# Patient Record
Sex: FEMALE | Race: WHITE | NOT HISPANIC OR LATINO | Employment: OTHER | URBAN - METROPOLITAN AREA
[De-identification: names, ages, dates, MRNs, and addresses within clinical notes are randomized per-mention and may not be internally consistent; named-entity substitution may affect disease eponyms.]

---

## 2024-11-19 ENCOUNTER — TELEPHONE (OUTPATIENT)
Dept: ORTHOPEDIC SURGERY | Facility: CLINIC | Age: 89
End: 2024-11-19

## 2024-11-19 ENCOUNTER — OFFICE VISIT (OUTPATIENT)
Dept: ORTHOPEDIC SURGERY | Facility: CLINIC | Age: 89
End: 2024-11-19
Payer: MEDICARE

## 2024-11-19 VITALS — BODY MASS INDEX: 23.54 KG/M2 | WEIGHT: 150 LBS | HEIGHT: 67 IN

## 2024-11-19 DIAGNOSIS — M16.12 PRIMARY OSTEOARTHRITIS OF LEFT HIP: ICD-10-CM

## 2024-11-19 DIAGNOSIS — M70.62 GREATER TROCHANTERIC BURSITIS OF LEFT HIP: ICD-10-CM

## 2024-11-19 DIAGNOSIS — M25.552 LEFT HIP PAIN: Primary | ICD-10-CM

## 2024-11-19 PROCEDURE — 99203 OFFICE O/P NEW LOW 30 MIN: CPT | Performed by: NURSE PRACTITIONER

## 2024-11-19 NOTE — PROGRESS NOTES
Subjective:     Patient ID: Sharifa Lozada is a 89 y.o. female.    Chief Complaint:  Hip pain, new patient to examiner   History of Present Illness  History of Present Illness  The patient is an 89-year-old female who presents to the clinic today for evaluation of her left lower extremity. She is accompanied by her daughter.    She initially experienced pain along the front of her hip, which then spread to the groin and has now settled on the side of the hip. This discomfort is particularly noticeable during activities that involve changing positions. Despite this, she continues to walk.    Her family has been actively involved in her care, using a massage gun and other conservative treatments on her lower extremity's soft tissue. She reports pain radiating down the front of her thigh and into her knee, with the intensity varying at times. There are days when she does not experience significant pain.    Today, she reports increased pain on the side of her hip, describing it as a 5 out of 10 in terms of discomfort, and characterizes it as aching and shooting in nature. She experiences symptoms while seated and avoids sleeping on the affected side, thus she does not have significant pain at rest.    She has not had any prior x-ray imaging. She lives at home with her daughter and reports no other concerns at present.         Social History     Occupational History    Not on file   Tobacco Use    Smoking status: Never    Smokeless tobacco: Never   Substance and Sexual Activity    Alcohol use: Never    Drug use: Defer    Sexual activity: Defer      History reviewed. No pertinent past medical history.  History reviewed. No pertinent surgical history.    History reviewed. No pertinent family history.            Objective:  Physical Exam    Vital signs reviewed.   General: No acute distress.  Eyes: conjunctiva clear; pupils equally round and reactive  ENT: external ears and nose atraumatic; oropharynx clear  CV: no peripheral  "edema  Resp: normal respiratory effort  Skin: no rashes or wounds; normal turgor  Psych: mood and affect appropriate; recent and remote memory intact    Vitals:    11/19/24 1443   Weight: 68 kg (150 lb)   Height: 170.2 cm (67\")         11/19/24  1443   Weight: 68 kg (150 lb)     Body mass index is 23.49 kg/m².      Left Hip Exam     Tenderness   The patient is experiencing tenderness in the greater trochanter and anterior.    Range of Motion   Abduction:  45   Adduction:  25   Extension:  0   Flexion:  110   External rotation:  40   Internal rotation: 20     Tests   Fadir:  Positive FADIR test    Other   Erythema: absent  Sensation: normal  Pulse: present    Comments:  Mildly positive logroll exam  positive stinchfield exam  Quad strength 4+ out of 5             Physical Exam      Imaging:  Left Hip X-Ray  Indication: Pain  AP and Frog Leg views    Findings:  No fracture  No bony lesion  Normal soft tissues  Moderate to advanced hip degeneration greater left than right    No prior studies were available for comparison.    Assessment:        1. Left hip pain    2. Primary osteoarthritis of left hip    3. Greater trochanteric bursitis of left hip         Assessment & Plan  1. Left lower extremity pain.  She began experiencing pain along the anterior aspect of the hip, traveling down into the groin, and settling into the lateral aspect of the hip. The pain radiates down into the anterior aspect of the thigh and into the knee, with severity fluctuating. She experiences greater pain today at the lateral aspect of the hip, rated 5 out of 10, aching, and shooting in nature. There is no significant pain at rest.  Corticosteroid injections were discussed as an option, but she wishes to hold off at this time. Proceeding with a corticosteroid injection in the office along the greater trochanter for treatment of greater trochanteric bursitis was discussed, which could offer some symptom improvement. However, given the moderate " amount of arthritis at the hip, an injection to the hip either fluoroscopy-guided or ultrasound-guided would be of greater benefit, especially with the pain radiating down to the knee.  Based on her treatment options in the past I do believe we have 1 chance for an injection therefore I would choose the hip injection under fluoroscopy or ultrasound.  She is advised to continue with soft tissue work and can apply lidocaine patches. Ice is recommended at the lateral aspect of the hip when comfortable. If symptoms worsen, she is asked to send a message regarding the injection, and the order can be placed without her being seen. Encouraged to call with any questions or concerns.      Orders:  Orders Placed This Encounter   Procedures    XR Hip With or Without Pelvis 2 - 3 View Left     No orders of the defined types were placed in this encounter.      Dragon dictation utilized  BMI cannot be calculated due to outdated height or weight values.  Please input a current height/weight in Vitals and re-renter BMIFOLLOWUP in Note to pull in correct documentation based on BMI range.       Patient or patient representative verbalized consent for the use of Ambient Listening during the visit with  WILFREDO Morelos for chart documentation. 11/19/2024  17:01 EST

## 2024-11-19 NOTE — TELEPHONE ENCOUNTER
Caller: GEO JIANG    Relationship to patient: Emergency Contact    Best call back number: [phone]    Patient is needing: PT'S DAUGHTER IS CALLING TO INFORM [facility] CAO AND THE OFFICE THAT THE PT HAS DEMENTIA AND WANTED TO MAKE VIR[place] CAO AWARE BEFORE THE APPOINTMENT TODAY AT 2:15PM.. PLEASE ADVISE..

## 2025-01-13 ENCOUNTER — HOSPITAL ENCOUNTER (OUTPATIENT)
Facility: HOSPITAL | Age: 89
Setting detail: OBSERVATION
Discharge: HOME | End: 2025-01-14
Attending: EMERGENCY MEDICINE | Admitting: EMERGENCY MEDICINE
Payer: MEDICARE

## 2025-01-13 ENCOUNTER — APPOINTMENT (OUTPATIENT)
Dept: GENERAL RADIOLOGY | Facility: HOSPITAL | Age: 89
End: 2025-01-13
Payer: MEDICARE

## 2025-01-13 DIAGNOSIS — I10 HYPERTENSION, UNSPECIFIED TYPE: ICD-10-CM

## 2025-01-13 DIAGNOSIS — R07.9 CHEST PAIN, UNSPECIFIED TYPE: ICD-10-CM

## 2025-01-13 DIAGNOSIS — I48.91 NEW ONSET ATRIAL FIBRILLATION: Primary | ICD-10-CM

## 2025-01-13 LAB
ALBUMIN SERPL-MCNC: 3.9 G/DL (ref 3.5–5.2)
ALBUMIN/GLOB SERPL: 1.4 G/DL
ALP SERPL-CCNC: 158 U/L (ref 39–117)
ALT SERPL W P-5'-P-CCNC: 22 U/L (ref 1–33)
ANION GAP SERPL CALCULATED.3IONS-SCNC: 8.6 MMOL/L (ref 5–15)
AST SERPL-CCNC: 23 U/L (ref 1–32)
BASOPHILS # BLD AUTO: 0.03 10*3/MM3 (ref 0–0.2)
BASOPHILS NFR BLD AUTO: 0.5 % (ref 0–1.5)
BILIRUB SERPL-MCNC: 0.5 MG/DL (ref 0–1.2)
BUN SERPL-MCNC: 18 MG/DL (ref 8–23)
BUN/CREAT SERPL: 19.6 (ref 7–25)
CALCIUM SPEC-SCNC: 9.4 MG/DL (ref 8.6–10.5)
CHLORIDE SERPL-SCNC: 106 MMOL/L (ref 98–107)
CO2 SERPL-SCNC: 24.4 MMOL/L (ref 22–29)
CREAT SERPL-MCNC: 0.92 MG/DL (ref 0.57–1)
DEPRECATED RDW RBC AUTO: 43.4 FL (ref 37–54)
EGFRCR SERPLBLD CKD-EPI 2021: 60.4 ML/MIN/1.73
EOSINOPHIL # BLD AUTO: 0.16 10*3/MM3 (ref 0–0.4)
EOSINOPHIL NFR BLD AUTO: 2.8 % (ref 0.3–6.2)
ERYTHROCYTE [DISTWIDTH] IN BLOOD BY AUTOMATED COUNT: 13 % (ref 12.3–15.4)
GEN 5 1HR TROPONIN T REFLEX: 12 NG/L
GLOBULIN UR ELPH-MCNC: 2.8 GM/DL
GLUCOSE SERPL-MCNC: 106 MG/DL (ref 65–99)
HCT VFR BLD AUTO: 43.1 % (ref 34–46.6)
HGB BLD-MCNC: 14.7 G/DL (ref 12–15.9)
HOLD SPECIMEN: NORMAL
HOLD SPECIMEN: NORMAL
IMM GRANULOCYTES # BLD AUTO: 0.01 10*3/MM3 (ref 0–0.05)
IMM GRANULOCYTES NFR BLD AUTO: 0.2 % (ref 0–0.5)
LYMPHOCYTES # BLD AUTO: 1.97 10*3/MM3 (ref 0.7–3.1)
LYMPHOCYTES NFR BLD AUTO: 34.1 % (ref 19.6–45.3)
MCH RBC QN AUTO: 31.3 PG (ref 26.6–33)
MCHC RBC AUTO-ENTMCNC: 34.1 G/DL (ref 31.5–35.7)
MCV RBC AUTO: 91.9 FL (ref 79–97)
MONOCYTES # BLD AUTO: 0.32 10*3/MM3 (ref 0.1–0.9)
MONOCYTES NFR BLD AUTO: 5.5 % (ref 5–12)
NEUTROPHILS NFR BLD AUTO: 3.29 10*3/MM3 (ref 1.7–7)
NEUTROPHILS NFR BLD AUTO: 56.9 % (ref 42.7–76)
NRBC BLD AUTO-RTO: 0 /100 WBC (ref 0–0.2)
PLATELET # BLD AUTO: 194 10*3/MM3 (ref 140–450)
PMV BLD AUTO: 9.4 FL (ref 6–12)
POTASSIUM SERPL-SCNC: 4.1 MMOL/L (ref 3.5–5.2)
PROT SERPL-MCNC: 6.7 G/DL (ref 6–8.5)
RBC # BLD AUTO: 4.69 10*6/MM3 (ref 3.77–5.28)
SODIUM SERPL-SCNC: 139 MMOL/L (ref 136–145)
TROPONIN T NUMERIC DELTA: 0 NG/L
TROPONIN T SERPL HS-MCNC: 12 NG/L
WBC NRBC COR # BLD AUTO: 5.78 10*3/MM3 (ref 3.4–10.8)
WHOLE BLOOD HOLD COAG: NORMAL
WHOLE BLOOD HOLD SPECIMEN: NORMAL

## 2025-01-13 PROCEDURE — G0378 HOSPITAL OBSERVATION PER HR: HCPCS

## 2025-01-13 PROCEDURE — 71045 X-RAY EXAM CHEST 1 VIEW: CPT

## 2025-01-13 PROCEDURE — 93005 ELECTROCARDIOGRAM TRACING: CPT | Performed by: EMERGENCY MEDICINE

## 2025-01-13 PROCEDURE — 84484 ASSAY OF TROPONIN QUANT: CPT

## 2025-01-13 PROCEDURE — 36415 COLL VENOUS BLD VENIPUNCTURE: CPT

## 2025-01-13 PROCEDURE — 85025 COMPLETE CBC W/AUTO DIFF WBC: CPT

## 2025-01-13 PROCEDURE — 93005 ELECTROCARDIOGRAM TRACING: CPT

## 2025-01-13 PROCEDURE — 84484 ASSAY OF TROPONIN QUANT: CPT | Performed by: EMERGENCY MEDICINE

## 2025-01-13 PROCEDURE — 80053 COMPREHEN METABOLIC PANEL: CPT

## 2025-01-13 PROCEDURE — 96374 THER/PROPH/DIAG INJ IV PUSH: CPT

## 2025-01-13 PROCEDURE — 99285 EMERGENCY DEPT VISIT HI MDM: CPT

## 2025-01-13 RX ORDER — AMOXICILLIN 250 MG
2 CAPSULE ORAL 2 TIMES DAILY PRN
Status: DISCONTINUED | OUTPATIENT
Start: 2025-01-13 | End: 2025-01-14 | Stop reason: HOSPADM

## 2025-01-13 RX ORDER — SODIUM CHLORIDE 0.9 % (FLUSH) 0.9 %
10 SYRINGE (ML) INJECTION AS NEEDED
Status: DISCONTINUED | OUTPATIENT
Start: 2025-01-13 | End: 2025-01-14 | Stop reason: HOSPADM

## 2025-01-13 RX ORDER — SODIUM CHLORIDE 0.9 % (FLUSH) 0.9 %
10 SYRINGE (ML) INJECTION EVERY 12 HOURS SCHEDULED
Status: DISCONTINUED | OUTPATIENT
Start: 2025-01-13 | End: 2025-01-14 | Stop reason: HOSPADM

## 2025-01-13 RX ORDER — POLYETHYLENE GLYCOL 3350 17 G/17G
17 POWDER, FOR SOLUTION ORAL DAILY PRN
Status: DISCONTINUED | OUTPATIENT
Start: 2025-01-13 | End: 2025-01-14 | Stop reason: HOSPADM

## 2025-01-13 RX ORDER — BISACODYL 10 MG
10 SUPPOSITORY, RECTAL RECTAL DAILY PRN
Status: DISCONTINUED | OUTPATIENT
Start: 2025-01-13 | End: 2025-01-14 | Stop reason: HOSPADM

## 2025-01-13 RX ORDER — BISACODYL 5 MG/1
5 TABLET, DELAYED RELEASE ORAL DAILY PRN
Status: DISCONTINUED | OUTPATIENT
Start: 2025-01-13 | End: 2025-01-14 | Stop reason: HOSPADM

## 2025-01-13 RX ORDER — ONDANSETRON 4 MG/1
4 TABLET, ORALLY DISINTEGRATING ORAL EVERY 6 HOURS PRN
Status: DISCONTINUED | OUTPATIENT
Start: 2025-01-13 | End: 2025-01-14 | Stop reason: HOSPADM

## 2025-01-13 RX ORDER — ONDANSETRON 2 MG/ML
4 INJECTION INTRAMUSCULAR; INTRAVENOUS EVERY 6 HOURS PRN
Status: DISCONTINUED | OUTPATIENT
Start: 2025-01-13 | End: 2025-01-14 | Stop reason: HOSPADM

## 2025-01-13 RX ORDER — ASPIRIN 325 MG
325 TABLET ORAL ONCE
Status: COMPLETED | OUTPATIENT
Start: 2025-01-13 | End: 2025-01-13

## 2025-01-13 RX ORDER — NITROGLYCERIN 0.4 MG/1
0.4 TABLET SUBLINGUAL
Status: DISCONTINUED | OUTPATIENT
Start: 2025-01-13 | End: 2025-01-14 | Stop reason: HOSPADM

## 2025-01-13 RX ORDER — ACETAMINOPHEN 650 MG/1
650 SUPPOSITORY RECTAL EVERY 4 HOURS PRN
Status: DISCONTINUED | OUTPATIENT
Start: 2025-01-13 | End: 2025-01-14 | Stop reason: HOSPADM

## 2025-01-13 RX ORDER — ACETAMINOPHEN 325 MG/1
650 TABLET ORAL EVERY 4 HOURS PRN
Status: DISCONTINUED | OUTPATIENT
Start: 2025-01-13 | End: 2025-01-14 | Stop reason: HOSPADM

## 2025-01-13 RX ORDER — ACETAMINOPHEN 160 MG/5ML
650 SOLUTION ORAL EVERY 4 HOURS PRN
Status: DISCONTINUED | OUTPATIENT
Start: 2025-01-13 | End: 2025-01-14 | Stop reason: HOSPADM

## 2025-01-13 RX ORDER — SODIUM CHLORIDE 9 MG/ML
40 INJECTION, SOLUTION INTRAVENOUS AS NEEDED
Status: DISCONTINUED | OUTPATIENT
Start: 2025-01-13 | End: 2025-01-14 | Stop reason: HOSPADM

## 2025-01-13 RX ADMIN — METOPROLOL TARTRATE 5 MG: 1 INJECTION, SOLUTION INTRAVENOUS at 20:20

## 2025-01-13 RX ADMIN — ASPIRIN 325 MG ORAL TABLET 325 MG: 325 PILL ORAL at 19:53

## 2025-01-14 ENCOUNTER — APPOINTMENT (OUTPATIENT)
Dept: CARDIOLOGY | Facility: HOSPITAL | Age: 89
End: 2025-01-14
Payer: MEDICARE

## 2025-01-14 VITALS
DIASTOLIC BLOOD PRESSURE: 83 MMHG | HEIGHT: 66 IN | HEART RATE: 90 BPM | WEIGHT: 145 LBS | RESPIRATION RATE: 18 BRPM | SYSTOLIC BLOOD PRESSURE: 148 MMHG | TEMPERATURE: 97.7 F | BODY MASS INDEX: 23.3 KG/M2 | OXYGEN SATURATION: 98 %

## 2025-01-14 LAB
ANION GAP SERPL CALCULATED.3IONS-SCNC: 8 MMOL/L (ref 5–15)
BH CV ECHO MEAS - ACS: 1.83 CM
BH CV ECHO MEAS - AO ROOT DIAM: 2.6 CM
BH CV ECHO MEAS - EDV(CUBED): 69.2 ML
BH CV ECHO MEAS - ESV(CUBED): 27.7 ML
BH CV ECHO MEAS - FS: 26.3 %
BH CV ECHO MEAS - IVS/LVPW: 0.79 CM
BH CV ECHO MEAS - IVSD: 0.98 CM
BH CV ECHO MEAS - LV MASS(C)D: 154 GRAMS
BH CV ECHO MEAS - LVIDD: 4.1 CM
BH CV ECHO MEAS - LVIDS: 3 CM
BH CV ECHO MEAS - LVOT AREA: 3.2 CM2
BH CV ECHO MEAS - LVOT DIAM: 2.01 CM
BH CV ECHO MEAS - LVPWD: 1.24 CM
BH CV ECHO MEAS - PA ACC TIME: 0.11 SEC
BH CV ECHO MEAS - PA V2 MAX: 82.2 CM/SEC
BH CV ECHO MEAS - RV MAX PG: 2.27 MMHG
BH CV ECHO MEAS - RV V1 MAX: 75.4 CM/SEC
BH CV ECHO MEAS - RV V1 VTI: 10.9 CM
BH CV ECHO MEAS - TR MAX PG: 21.8 MMHG
BH CV ECHO MEAS - TR MAX VEL: 233.5 CM/SEC
BUN SERPL-MCNC: 15 MG/DL (ref 8–23)
BUN/CREAT SERPL: 21.4 (ref 7–25)
CALCIUM SPEC-SCNC: 9.1 MG/DL (ref 8.6–10.5)
CHLORIDE SERPL-SCNC: 108 MMOL/L (ref 98–107)
CO2 SERPL-SCNC: 22 MMOL/L (ref 22–29)
CREAT SERPL-MCNC: 0.7 MG/DL (ref 0.57–1)
DEPRECATED RDW RBC AUTO: 43.2 FL (ref 37–54)
EGFRCR SERPLBLD CKD-EPI 2021: 83.8 ML/MIN/1.73
ERYTHROCYTE [DISTWIDTH] IN BLOOD BY AUTOMATED COUNT: 12.8 % (ref 12.3–15.4)
GLUCOSE SERPL-MCNC: 90 MG/DL (ref 65–99)
HCT VFR BLD AUTO: 42 % (ref 34–46.6)
HGB BLD-MCNC: 14 G/DL (ref 12–15.9)
MCH RBC QN AUTO: 31.3 PG (ref 26.6–33)
MCHC RBC AUTO-ENTMCNC: 33.3 G/DL (ref 31.5–35.7)
MCV RBC AUTO: 94 FL (ref 79–97)
PLATELET # BLD AUTO: 183 10*3/MM3 (ref 140–450)
PMV BLD AUTO: 9.5 FL (ref 6–12)
POTASSIUM SERPL-SCNC: 3.8 MMOL/L (ref 3.5–5.2)
QT INTERVAL: 352 MS
QTC INTERVAL: 431 MS
RBC # BLD AUTO: 4.47 10*6/MM3 (ref 3.77–5.28)
SINUS: 2.7 CM
SODIUM SERPL-SCNC: 138 MMOL/L (ref 136–145)
STJ: 2.6 CM
TROPONIN T SERPL HS-MCNC: 13 NG/L
WBC NRBC COR # BLD AUTO: 5.36 10*3/MM3 (ref 3.4–10.8)

## 2025-01-14 PROCEDURE — G0378 HOSPITAL OBSERVATION PER HR: HCPCS

## 2025-01-14 PROCEDURE — 93308 TTE F-UP OR LMTD: CPT

## 2025-01-14 PROCEDURE — 93321 DOPPLER ECHO F-UP/LMTD STD: CPT

## 2025-01-14 PROCEDURE — 93325 DOPPLER ECHO COLOR FLOW MAPG: CPT

## 2025-01-14 PROCEDURE — 80048 BASIC METABOLIC PNL TOTAL CA: CPT

## 2025-01-14 PROCEDURE — 85027 COMPLETE CBC AUTOMATED: CPT

## 2025-01-14 PROCEDURE — 84484 ASSAY OF TROPONIN QUANT: CPT

## 2025-01-14 RX ORDER — METOPROLOL SUCCINATE 25 MG/1
25 TABLET, EXTENDED RELEASE ORAL
Status: DISCONTINUED | OUTPATIENT
Start: 2025-01-14 | End: 2025-01-14

## 2025-01-14 RX ORDER — METOPROLOL SUCCINATE 25 MG/1
12.5 TABLET, EXTENDED RELEASE ORAL
Status: DISCONTINUED | OUTPATIENT
Start: 2025-01-14 | End: 2025-01-14 | Stop reason: HOSPADM

## 2025-01-14 RX ADMIN — METOPROLOL SUCCINATE 12.5 MG: 25 TABLET, EXTENDED RELEASE ORAL at 08:00

## 2025-01-14 RX ADMIN — Medication 10 ML: at 08:00

## 2025-01-14 NOTE — CASE MANAGEMENT/SOCIAL WORK
Case Management Discharge Note      Final Note: Home         Selected Continued Care - Discharged on 1/14/2025 Admission date: 1/13/2025 - Discharge disposition: Home or Self Care      Destination    No services have been selected for the patient.                Durable Medical Equipment    No services have been selected for the patient.                Dialysis/Infusion    No services have been selected for the patient.                Home Medical Care    No services have been selected for the patient.                Therapy    No services have been selected for the patient.                Community Resources    No services have been selected for the patient.                Community & DME    No services have been selected for the patient.                    Transportation Services  Private: Car    Final Discharge Disposition Code: 01 - home or self-care

## 2025-01-14 NOTE — PROGRESS NOTES
MD ATTESTATION NOTE     SHARED VISIT: This visit was performed by BOTH a physician and an APC. The substantive portion of the medical decision making was performed by this attesting physician who made or approved the management plan and takes responsibility for patient management. All studies in the APC note (if performed) were independently interpreted by me.  The VENESSA and I have discussed this patient's history, physical exam, and treatment plan. I have reviewed the documentation and personally had a face to face interaction with the patient. I affirm the documentation and agree with the treatment and plan. I provided a substantive portion of the care of the patient.  I personally performed the physical exam in its entirety, and below are my findings.      Brief HPI: Patient is resting comfortably.  Denies chest pain, shortness of breath, dizziness, or palpitations.    PHYSICAL EXAM    GENERAL: Awake and alert.  Nontoxic appearing elderly female.  Resting comfortably in no acute distress  HENT: nares patent  EYES: no scleral icterus  CV: Irregularly irregular rhythm, normal rate  RESPIRATORY: normal effort, CTAB  ABDOMEN: soft  MUSCULOSKELETAL: no deformity  NEURO: alert, moves all extremities, follows commands  PSYCH:  calm, cooperative  SKIN: warm, dry    Vital signs and nursing notes reviewed.        Plan: Patient is asymptomatic.  She remains in A-fib but is not currently tachycardic.  Troponin is negative x 3.  Labs are unremarkable. Echocardiogram showed normal LV systolic function with mild concentric LVH.  There was moderate tricuspid regurgitation.  Cardiology consult is pending.

## 2025-01-14 NOTE — DISCHARGE SUMMARY
ED OBSERVATION PROGRESS/DISCHARGE SUMMARY    Date of Admission: 1/13/2025   LOS: 0 days   PCP: Provider, No Known    Final Diagnosis new onset A-fib      Subjective     Hospital Outcome: Sharifa Lozada is a 89 y.o. female, with no known past medical history, was admitted to the observation unit after having an episode of chest pain at home.  Patient states she was sitting at home when she all of a sudden began having a nonradiating midsternal chest pressure that lasted approximately half an hour.  Her daughter drove her to the emergency department and about the time they arrived her pain had subsided.  She denies any nausea, vomiting, shortness of breath, or diaphoresis associated with her chest pain.  She denies any palpitations.  Upon arrival to the emergency department she was found to be in atrial fibrillation.  Patient denies ever being diagnosed with atrial fibrillation or having an irregular heartbeat.  She does however state that she has had episodes for the past 5 years of having a rapid heartbeat that last approximately 5 to 10 minutes and subsequently goes away.  She has never sought treatment for this however.  Echocardiogram has been ordered for the a.m.  Cardiology has been consulted to see the patient.    1/14/2025: Patient denies any new complaints today.  Echo obtained but was a limited study due to patient refusal to complete the exam (patient has a known history of dementia) did show EF of 61 to 65% mild concentric hypertrophy, and moderate tricuspid valve regurg.  Cardiology saw and evaluated the patient and discussed options for further cardiac testing versus trial of over-the-counter medication for GERD and patient does not want to pursue any further testing and would like to trial over-the-counter PPI and follow-up with cardiology outpatient in 1 to 2 weeks.  All labs and imaging findings discussed with patient as well as specialist recommendations and patient is agreeable for discharge home at  this time.    ROS:  General: no fevers, chills  Respiratory: no cough, dyspnea  Cardiovascular: no chest pain, palpitations  Abdomen: No abdominal pain, nausea, vomiting, or diarrhea  Neurologic: No focal weakness    Objective   Physical Exam:  I have reviewed the vital signs.  Temp:  [97.5 °F (36.4 °C)-98 °F (36.7 °C)] 97.7 °F (36.5 °C)  Heart Rate:  [] 90  Resp:  [16-20] 18  BP: (100-160)/() 148/83  General Appearance:    Alert, cooperative, no distress  Head:    Normocephalic, atraumatic, normal hearing  Eyes:    Sclerae anicteric, EOMI  Neck:   Supple, nontender  Lungs: Clear to auscultation bilaterally, respirations unlabored on room air  Heart: Regular rate and rhythm, S1 and S2 normal, no murmur  Abdomen:  Soft, nontender, bowel sounds active, nondistended  Extremities: No clubbing, cyanosis, or edema to lower extremities  Pulses:  2+ and symmetric in distal lower extremities  Skin: No rashes   Neurologic: Oriented x3, Normal strength to extremities, poor memory    Results Review:    I have reviewed the labs, radiology results and diagnostic studies.    Results from last 7 days   Lab Units 01/14/25  0224   WBC 10*3/mm3 5.36   HEMOGLOBIN g/dL 14.0   HEMATOCRIT % 42.0   PLATELETS 10*3/mm3 183     Results from last 7 days   Lab Units 01/14/25  0224 01/13/25  1757   SODIUM mmol/L 138 139   POTASSIUM mmol/L 3.8 4.1   CHLORIDE mmol/L 108* 106   CO2 mmol/L 22.0 24.4   BUN mg/dL 15 18   CREATININE mg/dL 0.70 0.92   CALCIUM mg/dL 9.1 9.4   BILIRUBIN mg/dL  --  0.5   ALK PHOS U/L  --  158*   ALT (SGPT) U/L  --  22   AST (SGOT) U/L  --  23   GLUCOSE mg/dL 90 106*     Imaging Results (Last 24 Hours)       Procedure Component Value Units Date/Time    XR Chest 1 View [[id number]] Collected: 01/13/25 1829     Updated: 01/13/25 1833    Narrative:      XR CHEST 1 VW-1/13/2025     HISTORY: Chest pain.     Heart size is at the upper limits of normal. Lungs appear clear. There  is some aortic calcification. Bony  structures appear unremarkable.       Impression:      1. Borderline cardiomegaly.        This report was finalized on 1/13/2025 6:30 PM by Dr. Fermin Beatty M.D on Workstation: [id number]               I have reviewed the medications.     Discharge Medications      Patient Not Prescribed Medications Upon Discharge        ---------------------------------------------------------------------------------------------  Assessment & Plan   Assessment/Problem List    New onset atrial fibrillation      Plan:    New onset atrial fibrillation  Chest pain   -Cardiac monitoring  -Vital signs every 4 hours   -Cardiology consult   -Troponins negative throughout admission  -EKG-atrial fibrillation, rate 90  -Echocardiogram limited due to patient refusing to complete exam, did show normal EF  -Cardiology saw and evaluated the patient and discussed options for further cardiac testing versus trial of over-the-counter medication for GERD and patient does not want to pursue any further testing and would like to trial over-the-counter PPI and follow-up with cardiology outpatient in 1 to 2 weeks.    Disposition: Discharge to home    Follow-up after Discharge: PCP in 1 to 2 weeks, cardiology in 1 to 2 weeks    This note will serve as a discharge summary    Shala Stringer PA-C 01/14/25 08:21 EST    I have worn appropriate PPE during this patient encounter, sanitized my hands both with entering and exiting patient's room.      39 minutes has been spent by [facility] providers in the care of this patient while under observation status

## 2025-01-14 NOTE — ED NOTES
Nursing report ED to floor  Sharifa Lozada  89 y.o.  female    HPI :  HPI  Stated Reason for Visit: cp    Chief Complaint  Chief Complaint   Patient presents with    Chest Pain       Admitting doctor:   Felice Kelly MD    Admitting diagnosis:   The primary encounter diagnosis was New onset atrial fibrillation. Diagnoses of Chest pain, unspecified type and Hypertension, unspecified type were also pertinent to this visit.    Code status:   Current Code Status       Date Active Code Status Order ID Comments User Context       1/13/2025 2148 CPR (Attempt to Resuscitate) [id number]  Rika Diamond, [facility]        Question Answer    Code Status (Patient has no pulse and is not breathing) CPR (Attempt to Resuscitate)    Medical Interventions (Patient has pulse or is breathing) Full Support                    Allergies:   Penicillins    Isolation:   No active isolations    Intake and Output  No intake or output data in the 24 hours ending 01/13/25 2200    Weight:   There were no vitals filed for this visit.    Most recent vitals:   Vitals:    01/13/25 2030 01/13/25 2047 01/13/25 2102 01/13/25 2114   BP: 100/79 126/86 127/61 140/83   BP Location:       Patient Position:       Pulse: 95 84 77 81   Resp:   16 18   Temp:       SpO2: 97% 96% 93% 98%       Active LDAs/IV Access:   Lines, Drains & Airways       Active LDAs       Name Placement date Placement time Site Days    Peripheral IV 01/13/25 2015 Anterior;Distal;Right Forearm 01/13/25 2015  Forearm  less than 1                    Labs (abnormal labs have a star):   Labs Reviewed   COMPREHENSIVE METABOLIC PANEL - Abnormal; Notable for the following components:       Result Value    Glucose 106 (*)     Alkaline Phosphatase 158 (*)     All other components within normal limits    Narrative:     GFR Categories in Chronic Kidney Disease (CKD)      GFR Category          GFR (mL/min/1.73)    Interpretation  G1                     90 or greater         Normal or high (1)  G2                       60-89                Mild decrease (1)  G3a                   45-59                Mild to moderate decrease  G3b                   30-44                Moderate to severe decrease  G4                    15-29                Severe decrease  G5                    14 or less           Kidney failure          (1)In the absence of evidence of kidney disease, neither GFR category G1 or G2 fulfill the criteria for CKD.    eGFR calculation 2021 CKD-EPI creatinine equation, which does not include race as a factor   TROPONIN - Normal    Narrative:     High Sensitive Troponin T Reference Range:  <14.0 ng/L- Negative Female for AMI  <22.0 ng/L- Negative Male for AMI  >=14 - Abnormal Female indicating possible myocardial injury.  >=22 - Abnormal Male indicating possible myocardial injury.   Clinicians would have to utilize clinical acumen, EKG, Troponin, and serial changes to determine if it is an Acute Myocardial Infarction or myocardial injury due to an underlying chronic condition.        CBC WITH AUTO DIFFERENTIAL - Normal   RAINBOW DRAW    Narrative:     The following orders were created for panel order [place] Draw.  Procedure                               Abnormality         Status                     ---------                               -----------         ------                     Green Top (Gel)[[id number]]                                  Final result               Lavender Top[[id number]]                                     Final result               Gold Top - SST[[id number]]                                   Final result               Light Blue Top[[id number]]                                   Final result                 Please view results for these tests on the individual orders.   HIGH SENSITIVITIY TROPONIN T 1HR   CBC AND DIFFERENTIAL    Narrative:     The following orders were created for panel order CBC & Differential.  Procedure                               Abnormality         Status                      ---------                               -----------         ------                     CBC Auto Differential[[id number]]        Normal              Final result                 Please view results for these tests on the individual orders.   GREEN TOP   LAVENDER TOP   GOLD TOP - SST   LIGHT BLUE TOP       EKG:   ECG 12 Lead ED Triage Standing Order; Chest Pain   Preliminary Result   HEART RATE=90  bpm   RR Mthfksjc=779  ms   SD Interval=  ms   P Horizontal Axis=  deg   P Front Axis=  deg   QRSD Interval=83  ms   QT Gnwafckb=804  ms   YPnB=586  ms   QRS Axis=69  deg   T Wave Axis=48  deg   - ABNORMAL ECG -   Atrial fibrillation   Date and Time of Study:2025-01-13 17:44:17          Meds given in ED:   Medications   sodium chloride 0.9 % flush 10 mL (has no administration in time range)   sodium chloride 0.9 % flush 10 mL (has no administration in time range)   sodium chloride 0.9 % flush 10 mL (has no administration in time range)   sodium chloride 0.9 % infusion 40 mL (has no administration in time range)   ondansetron ODT (ZOFRAN-ODT) disintegrating tablet 4 mg (has no administration in time range)     Or   ondansetron (ZOFRAN) injection 4 mg (has no administration in time range)   nitroglycerin (NITROSTAT) SL tablet 0.4 mg (has no administration in time range)   Potassium Replacement - Follow Nurse / BPA Driven Protocol (has no administration in time range)   Magnesium Standard Dose Replacement - Follow Nurse / BPA Driven Protocol (has no administration in time range)   Phosphorus Replacement - Follow Nurse / BPA Driven Protocol (has no administration in time range)   Calcium Replacement - Follow Nurse / BPA Driven Protocol (has no administration in time range)   acetaminophen (TYLENOL) tablet 650 mg (has no administration in time range)     Or   acetaminophen (TYLENOL) 160 MG/5ML oral solution 650 mg (has no administration in time range)     Or   acetaminophen (TYLENOL) suppository 650 mg (has no  administration in time range)   sennosides-docusate (PERICOLACE) 8.6-50 MG per tablet 2 tablet (has no administration in time range)     And   polyethylene glycol (MIRALAX) packet 17 g (has no administration in time range)     And   bisacodyl (DULCOLAX) EC tablet 5 mg (has no administration in time range)     And   bisacodyl (DULCOLAX) suppository 10 mg (has no administration in time range)   aspirin tablet 325 mg (325 mg Oral Given 1/13/25 1953)   metoprolol tartrate (LOPRESSOR) injection 5 mg (5 mg Intravenous Given 1/13/25 2020)       Imaging results:  XR Chest 1 View    Result Date: 1/13/2025  1. Borderline cardiomegaly.   This report was finalized on 1/13/2025 6:30 PM by Dr. Fermin Beatty M.D on Workstation: [id number]       Ambulatory status:   - +1    Social issues:   Social History     Socioeconomic History    Marital status:    Tobacco Use    Smoking status: Never    Smokeless tobacco: Never   Substance and Sexual Activity    Alcohol use: Never    Drug use: Defer    Sexual activity: Defer       Peripheral Neurovascular  Peripheral Neurovascular (Adult)  Peripheral Neurovascular WDL: WDL    Neuro Cognitive  Neuro Cognitive (Adult)  Cognitive/Neuro/Behavioral WDL: WDL  Sedation Group  POSS (Pasero Opioid-Induced Sed Scale): 1 - Awake and alert    Learning  Learning Assessment  Learning Readiness and Ability: cognitive limitation noted  Education Provided  Person Taught: patient, family member/friend  Teaching Method: verbal instruction  Teaching Focus: symptom/problem overview, diagnostic test  Education Outcome Evaluation: verbalizes understanding    Respiratory  Respiratory WDL  Respiratory WDL: WDL    Abdominal Pain       Pain Assessments  Pain (Adult)  (0-10) Pain Rating: Rest: 0  Response to Pain Interventions: interventions effective per patient    NIH Stroke Scale       Meg Linares, RN  01/13/25 22:00 EST

## 2025-01-14 NOTE — PLAN OF CARE
Goal Outcome Evaluation:      Pt dc via private vehicle with belongings. IV removed. Education provided to pt and daughter. Questions encouraged and answered.

## 2025-01-14 NOTE — H&P
[facility]   HISTORY AND PHYSICAL    Patient Name: Sharifa Lozada  : 1935  MRN: [id number]  Primary Care Physician:  Provider, No Known  Date of admission: 2025    Subjective   Subjective     Chief Complaint:   Chief Complaint   Patient presents with    Chest Pain         HPI:    Sharifa Lozada is a 89 y.o. female, with no known past medical history, was admitted to the observation unit after having an episode of chest pain at home.  Patient states she was sitting at home when she all of a sudden began having a nonradiating midsternal chest pressure that lasted approximately half an hour.  Her daughter drove her to the emergency department and about the time they arrived her pain had subsided.  She denies any nausea, vomiting, shortness of breath, or diaphoresis associated with her chest pain.  She denies any palpitations.  Upon arrival to the emergency department she was found to be in atrial fibrillation.  Patient denies ever being diagnosed with atrial fibrillation or having an irregular heartbeat.  She does however state that she has had episodes for the past 5 years of having a rapid heartbeat that last approximately 5 to 10 minutes and subsequently goes away.  She has never sought treatment for this however.  Echocardiogram has been ordered for the a.m.  Cardiology has been consulted to see the patient.    Review of Systems   All systems were reviewed and negative except for: What was mentioned above in the HPI.    Personal History     History reviewed. No pertinent past medical history.    History reviewed. No pertinent surgical history.    Family History: family history is not on file. Otherwise pertinent FHx was reviewed and not pertinent to current issue.    Social History:  reports that she has never smoked. She has never used smokeless tobacco. Drug use questions deferred to the physician. She reports that she does not drink alcohol.    Home Medications:       Allergies:  Allergies   Allergen  Reactions    Penicillins Unknown - High Severity       Objective   Objective     Vitals:   Temp:  [97.5 °F (36.4 °C)-97.8 °F (36.6 °C)] 97.5 °F (36.4 °C)  Heart Rate:  [] 79  Resp:  [16-20] 18  BP: (100-160)/() 123/94  Physical Exam   Constitutional: Awake, alert   Eyes: PERRLA   HENT: NCAT, mucous membranes moist   Neck: Supple   Respiratory: Clear to auscultation bilaterally, nonlabored respirations    Cardiovascular: irregular rate, palpable pedal pulses bilaterally   Gastrointestinal: Positive bowel sounds, soft, nontender, nondistended   Musculoskeletal: No bilateral ankle edema   Psychiatric: Appropriate affect, cooperative   Neurologic: Oriented x 3, speech clear   Skin: No rashes       Result Review    Result Review:  I have personally reviewed the results from the time of this admission to 1/13/2025 23:19 EST and agree with these findings:  [x]  Laboratory list / accordion  []  Microbiology  [x]  Radiology  [x]  EKG/Telemetry   []  Cardiology/Vascular   []  Pathology  [x]  Old records  []  Other:    Lab work in the emergency department is unremarkable other than a glucose of 106, alkaline phosphatase of 158.  EKG shows atrial fibrillation with a rate of 90.  Chest x-ray shows borderline cardiomegaly.      Assessment & Plan   Assessment / Plan     Brief Patient Summary:  Sharifa Lozada is a 89 y.o. female who was admitted to the observation unit for further evaluation and treatment of her new onset atrial fibrillation with RVR and chest pain.      Active Hospital Problems:  Active Hospital Problems    Diagnosis     **New onset atrial fibrillation      Plan:     New onset atrial fibrillation  Chest pain   -Cardiac monitoring  -Vital signs every 4 hours   -Cardiology consult   -High-sensitivity troponin 12, 12, trend  -EKG-atrial fibrillation, rate 90  -N.p.o. after midnight   -Echocardiogram in a.m.        VTE Prophylaxis:  Mechanical VTE prophylaxis orders are present.        CODE STATUS:    Code  Status (Patient has no pulse and is not breathing): CPR (Attempt to Resuscitate)  Medical Interventions (Patient has pulse or is breathing): Full Support    Admission Status:  I believe this patient meets observation status.    76 minutes have been spent by [facility] providers in the care of this patient while under observation status.      Appropriate PPE worn during patient encounter.  Hand hygeine performed before and after seeing the patient.      Electronically signed by WILFREDO Amin, 01/13/25, 11:19 PM EST.

## 2025-01-14 NOTE — ED PROVIDER NOTES
EMERGENCY DEPARTMENT ENCOUNTER    Room Number:  16/16  PCP: Provider, No Known  Historian: Patient/family      HPI:  Chief Complaint: Chest pain  A complete HPI/ROS/PMH/PSH/SH/FH are unobtainable due to: None  Context: Sharifa Lozada is a 89 y.o. female who presents to the ED c/o sudden onset of mid/substernal chest discomfort that began roughly 2 to 3 hours prior to ED arrival today.  She reports that the pain probably lasted at least 30 minutes before it subsequently resolved.  Currently, she reports that she is chest pain-free.  She did describe the pain at that time as a severe pain that was dull in nature and nonradiating.  She denies any associated heart palpitations, shortness of breath, nausea/vomiting, back pain, extremity pain, abdominal pain, or diaphoresis.            PAST MEDICAL HISTORY  Active Ambulatory Problems     Diagnosis Date Noted    No Active Ambulatory Problems     Resolved Ambulatory Problems     Diagnosis Date Noted    No Resolved Ambulatory Problems     No Additional Past Medical History         PAST SURGICAL HISTORY  History reviewed. No pertinent surgical history.      FAMILY HISTORY  History reviewed. No pertinent family history.      SOCIAL HISTORY  Social History     Socioeconomic History    Marital status:    Tobacco Use    Smoking status: Never    Smokeless tobacco: Never   Substance and Sexual Activity    Alcohol use: Never    Drug use: Defer    Sexual activity: Defer         ALLERGIES  Penicillins        REVIEW OF SYSTEMS  Review of Systems   Constitutional:  Negative for fever.   HENT:  Negative for sore throat.    Eyes: Negative.    Respiratory:  Negative for cough and shortness of breath.    Cardiovascular:  Positive for chest pain.   Gastrointestinal:  Negative for abdominal pain, diarrhea and vomiting.   Genitourinary:  Negative for dysuria.   Musculoskeletal:  Negative for neck pain.   Skin:  Negative for rash.   Allergic/Immunologic: Negative.    Neurological:   Negative for weakness, numbness and headaches.   Hematological: Negative.    Psychiatric/Behavioral: Negative.     All other systems reviewed and are negative.         PHYSICAL EXAM  ED Triage Vitals   Temp Heart Rate Resp BP SpO2   01/13/25 1734 01/13/25 1734 01/13/25 1734 01/13/25 1738 01/13/25 1734   97.8 °F (36.6 °C) 94 18 142/79 98 %      Temp src Heart Rate Source Patient Position BP Location FiO2 (%)   -- 01/13/25 1734 01/13/25 1738 01/13/25 1738 --    Monitor Sitting Right arm        Physical Exam  Constitutional:       General: She is not in acute distress.     Appearance: Normal appearance. She is not ill-appearing or toxic-appearing.   HENT:      Head: Normocephalic and atraumatic.   Eyes:      Extraocular Movements: Extraocular movements intact.      Pupils: Pupils are equal, round, and reactive to light.   Cardiovascular:      Rate and Rhythm: Tachycardia present. Rhythm irregularly irregular.      Heart sounds: No murmur heard.     No friction rub. No gallop.   Pulmonary:      Effort: Pulmonary effort is normal.      Breath sounds: Normal breath sounds.   Abdominal:      General: Abdomen is flat. There is no distension.      Palpations: Abdomen is soft.      Tenderness: There is no abdominal tenderness.   Musculoskeletal:         General: No swelling or tenderness. Normal range of motion.      Cervical back: Normal range of motion and neck supple.   Skin:     General: Skin is warm and dry.   Neurological:      General: No focal deficit present.      Mental Status: She is alert and oriented to person, place, and time.      Sensory: No sensory deficit.      Motor: No weakness.   Psychiatric:         Mood and Affect: Mood normal.         Behavior: Behavior normal.           Vital signs and nursing notes reviewed.          LAB RESULTS  Recent Results (from the past 24 hours)   ECG 12 Lead ED Triage Standing Order; Chest Pain    Collection Time: 01/13/25  5:44 PM   Result Value Ref Range    QT Interval 352  ms    QTC Interval 431 ms   Comprehensive Metabolic Panel    Collection Time: 01/13/25  5:57 PM    Specimen: Arm, Left; Blood   Result Value Ref Range    Glucose 106 (H) 65 - 99 mg/dL    BUN 18 8 - 23 mg/dL    Creatinine 0.92 0.57 - 1.00 mg/dL    Sodium 139 136 - 145 mmol/L    Potassium 4.1 3.5 - 5.2 mmol/L    Chloride 106 98 - 107 mmol/L    CO2 24.4 22.0 - 29.0 mmol/L    Calcium 9.4 8.6 - 10.5 mg/dL    Total Protein 6.7 6.0 - 8.5 g/dL    Albumin 3.9 3.5 - 5.2 g/dL    ALT (SGPT) 22 1 - 33 U/L    AST (SGOT) 23 1 - 32 U/L    Alkaline Phosphatase 158 (H) 39 - 117 U/L    Total Bilirubin 0.5 0.0 - 1.2 mg/dL    Globulin 2.8 gm/dL    A/G Ratio 1.4 g/dL    BUN/Creatinine Ratio 19.6 7.0 - 25.0    Anion Gap 8.6 5.0 - 15.0 mmol/L    eGFR 60.4 >60.0 mL/min/1.73   High Sensitivity Troponin T    Collection Time: 01/13/25  5:57 PM    Specimen: Arm, Left; Blood   Result Value Ref Range    HS Troponin T 12 <14 ng/L   Green Top (Gel)    Collection Time: 01/13/25  5:57 PM   Result Value Ref Range    Extra Tube Hold for add-ons.    Lavender Top    Collection Time: 01/13/25  5:57 PM   Result Value Ref Range    Extra Tube hold for add-on    Gold Top - SST    Collection Time: 01/13/25  5:57 PM   Result Value Ref Range    Extra Tube Hold for add-ons.    Light Blue Top    Collection Time: 01/13/25  5:57 PM   Result Value Ref Range    Extra Tube Hold for add-ons.    CBC Auto Differential    Collection Time: 01/13/25  5:57 PM    Specimen: Arm, Left; Blood   Result Value Ref Range    WBC 5.78 3.40 - 10.80 10*3/mm3    RBC 4.69 3.77 - 5.28 10*6/mm3    Hemoglobin 14.7 12.0 - 15.9 g/dL    Hematocrit 43.1 34.0 - 46.6 %    MCV 91.9 79.0 - 97.0 fL    MCH 31.3 26.6 - 33.0 pg    MCHC 34.1 31.5 - 35.7 g/dL    RDW 13.0 12.3 - 15.4 %    RDW-SD 43.4 37.0 - 54.0 fl    MPV 9.4 6.0 - 12.0 fL    Platelets 194 140 - 450 10*3/mm3    Neutrophil % 56.9 42.7 - 76.0 %    Lymphocyte % 34.1 19.6 - 45.3 %    Monocyte % 5.5 5.0 - 12.0 %    Eosinophil % 2.8 0.3 - 6.2 %     Basophil % 0.5 0.0 - 1.5 %    Immature Grans % 0.2 0.0 - 0.5 %    Neutrophils, Absolute 3.29 1.70 - 7.00 10*3/mm3    Lymphocytes, Absolute 1.97 0.70 - 3.10 10*3/mm3    Monocytes, Absolute 0.32 0.10 - 0.90 10*3/mm3    Eosinophils, Absolute 0.16 0.00 - 0.40 10*3/mm3    Basophils, Absolute 0.03 0.00 - 0.20 10*3/mm3    Immature Grans, Absolute 0.01 0.00 - 0.05 10*3/mm3    nRBC 0.0 0.0 - 0.2 /100 WBC       Ordered the above labs and reviewed the results.        RADIOLOGY  XR Chest 1 View    Result Date: 1/13/2025  XR CHEST 1 VW-1/13/2025  HISTORY: Chest pain.  Heart size is at the upper limits of normal. Lungs appear clear. There is some aortic calcification. Bony structures appear unremarkable.      1. Borderline cardiomegaly.   This report was finalized on 1/13/2025 6:30 PM by Dr. Fermin Beatty M.D on Workstation: [id number]       Ordered the above noted radiological studies. Reviewed by me in PACS.            PROCEDURES  Procedures    EKG independently interpreted by myself as follows:    EKG          EKG time: 1744  Rhythm/Rate: atrial fibrillation, 90  P waves and MT: absent  QRS, axis: nml, nml   ST and T waves: nml     Interpreted Contemporaneously by me, independently viewed  Previous EKG available for comparison        HEART SCORE    History Moderately suspicious (1)  ECG Normal (0)  Age > or = 65 (2)  Risk factors No risk factors (0)  Troponin < or = Normal limit (0)    This patient's HEART score is 3    HEART Score Key:  Scores 0-3: 0.9-1.7% risk of adverse cardiac event. In the HEART Score study, these patients were discharged (0.99% in the retrospective study, 1.7% in the prospective study)  Scores 4-6: 12-16.6% risk of adverse cardiac event. In the HEART Score study, these patients were admitted to the hospital. (11.6% retrospective, 16.6% prospective)  Scores >=7: 50-65% risk of adverse cardiac event. In the HEART Score study, these patients were candidates for early invasive measures. (65.2%  retrospective, 50.1% prospective)        MEDICATIONS GIVEN IN ER  Medications   sodium chloride 0.9 % flush 10 mL (has no administration in time range)   aspirin tablet 325 mg (325 mg Oral Given 1/13/25 1953)   metoprolol tartrate (LOPRESSOR) injection 5 mg (5 mg Intravenous Given 1/13/25 2020)                   MEDICAL DECISION MAKING, PROGRESS, and CONSULTS    All labs have been independently reviewed by me.  All radiology studies have been reviewed by me and I have also reviewed the radiology report.   EKG's independently viewed and interpreted by me.  Discussion below represents my analysis of pertinent findings related to patient's condition, differential diagnosis, treatment plan and final disposition.      Additional sources:  - Discussed/ obtained information from independent historians: History obtained from the patient as well as the patient's family at bedside.    - External (non-ED) record review: Upon medical records review, the patient was last seen and evaluated in the outpatient office of orthopedics on 11/19/2024 for evaluation of left hip pain with osteoarthritis.    - Chronic or social conditions impacting care: Reported    - Shared decision making: Patient decision based on shared conversations have between myself, the patient and family at bedside, as well as WILFREDO Price.      Orders placed during this visit:  Orders Placed This Encounter   Procedures    XR Chest 1 View    [place] Draw    Comprehensive Metabolic Panel    High Sensitivity Troponin T    CBC Auto Differential    High Sensitivity Troponin T 1Hr    NPO Diet NPO Type: Strict NPO    Undress & Gown    Continuous Pulse Oximetry    Oxygen Therapy- Nasal Cannula; Titrate 1-6 LPM Per SpO2; 90 - 95%    ECG 12 Lead ED Triage Standing Order; Chest Pain    ECG 12 Lead ED Triage Standing Order; Chest Pain    Insert Peripheral IV    Initiate ED Observation Status    CBC & Differential    Green Top (Gel)    Lavender Top    Gold Top - SST     Light Blue Top           Differential diagnosis includes but is not limited to:    Acute coronary syndrome, new onset atrial fibrillation, cardiac rhythm disturbance, GERD/gastritis, peptic ulcer disease, pulmonary embolism, pneumothorax, or pneumonia      Independent interpretation of labs, radiology studies, and discussions with consultants:    Chest x-ray independently interpreted by myself with my interpretation showing no cardiomegaly nor air of edema, infiltrate, or pneumothorax.          ED Course as of 01/13/25 2139 Mon Jan 13, 2025 1954 The patient is currently chest pain-free however does appear to be in a new onset atrial fibrillation with rapid ventricular response.  The patient's heart rate is ranging anywhere from  on the monitor.  She does seem somewhat asymptomatic even without rapid swing and heart rate.  We will treat with a dose of IV Lopressor and reassess following. [BM]   2128 On reevaluation, the patient is resting comfortably and without further complaint.  I informed the patient as well as the patient's family that I am highly concerned obviously with the reports of chest discomfort but also the new onset atrial fibrillation that she is experiencing.  Ultimately, would like to admit her today to the observation unit for cardiology evaluation to which she and the family are agreeable.  All questions answered. [BM]   2137 The patient's presentation, workup, as well as diagnosis and treatment plan was discussed at length with WILFREDO Price.  She agrees to admit the patient to the observation unit today with Dr. Kelly. [BM]      ED Course User Index  [BM] Ricki Stringer MD         DIAGNOSIS  Final diagnoses:   New onset atrial fibrillation   Chest pain, unspecified type   Hypertension, unspecified type         DISPOSITION  ADMISSION    Discussed treatment plan and reason for admission with pt/family and admitting physician.  Pt/family voiced understanding of the plan for  admission for further testing/treatment as needed.               Latest Documented Vital Signs:  As of 21:39 EST  BP- 140/83 HR- 81 Temp- 97.8 °F (36.6 °C) O2 sat- 98%              --    Please note that portions of this were completed with a voice recognition program.       Note Disclaimer: At [facility], we believe that sharing information builds trust and better relationships. You are receiving this note because you are receiving care at [facility] or recently visited. It is possible you will see health information before a provider has talked with you about it. This kind of information can be easy to misunderstand. To help you fully understand what it means for your health, we urge you to discuss this note with your provider.             Ricki Stringer MD  01/13/25 5883

## 2025-01-14 NOTE — PLAN OF CARE
Goal Outcome Evaluation:  Plan of Care Reviewed With: patient           Outcome Evaluation: oriented to self only, standby assist to the bathroom, bed alarm activated at all times, NPO since midnight, IV replaced because pt pulled it out, and a consult to cards.       Daily metoprolol ordered

## 2025-01-14 NOTE — CONSULTS
Patient Name: Sharifa Lozada  :1935  89 y.o.    Date of Admission: 2025  Encounter Provider: Emily Kaufman MD  Date of Encounter Visit: 25  Place of Service: [facility] CARDIOLOGY  Referring Provider: Felice Kelly MD  Patient Care Team:  Provider, No Known as PCP - General      Chief complaint: chest pain       History of Present Illness:     This is a nice lady who has not had any significant medical issues.  Yesterday she was sitting in her chair when she developed midsternal chest discomfort.  It was severe enough that she requested to come to the hospital which is not normal for her.  She has not been having any exertional chest pain.  Her son relates that she can generally put her trash can out to the curb and back which is a long driveway and does not have any issues.  She was diagnosed with atrial fibrillation which is new for her.  I reviewed her EKG and it does not show any ischemic changes.  She has had 3 normal troponins.  Echocardiogram did not show any LV wall motion abnormalities.        ECHO 25         Limited study due to reported patient refusal to complete exam.    Left ventricular systolic function is normal. Left ventricular ejection fraction appears to be 61 - 65%.    Left ventricular wall thickness is consistent with mild concentric hypertrophy.    Moderate tricuspid valve regurgitation is present.    There is a small (<1cm) pericardial effusion. There is no evidence of cardiac tamponade.    Biatrial and IVC size not evaluated due to truncated exam    History reviewed. No pertinent past medical history.    History reviewed. No pertinent surgical history.      Prior to Admission medications    Not on File       Allergies   Allergen Reactions    Penicillins Unknown - High Severity       Social History     Socioeconomic History    Marital status:    Tobacco Use    Smoking status: Never    Smokeless tobacco: Never   Vaping Use     "Vaping status: Former   Substance and Sexual Activity    Alcohol use: Never    Drug use: Defer    Sexual activity: Defer       History reviewed. No pertinent family history.    REVIEW OF SYSTEMS:   All other systems reviewed and negative.        Objective:     Vitals:    01/13/25 2307 01/14/25 0308 01/14/25 0649 01/14/25 0734   BP: 123/94 140/78  148/83   BP Location: Right arm Left arm  Right arm   Patient Position: Lying Lying  Lying   Pulse: 79 78  90   Resp: 18 17  18   Temp: 97.5 °F (36.4 °C) 98 °F (36.7 °C)  97.7 °F (36.5 °C)   TempSrc: Oral Oral  Oral   SpO2: 100% 97%  98%   Weight:   65.8 kg (145 lb)    Height:   167.6 cm (66\")      Body mass index is 23.4 kg/m².  No intake or output data in the 24 hours ending 01/14/25 1040    General: Alert, no acute distress  Neck: No JVD  Cardiac: Irregularly irregular  Pulmonary: normal rate. No wheezes, crackles or rales. Clear to auscultation  Extremities: No edema.    Lab Review:     Results from last 7 days   Lab Units 01/14/25  0224 01/13/25  1757   SODIUM mmol/L 138 139   POTASSIUM mmol/L 3.8 4.1   CHLORIDE mmol/L 108* 106   CO2 mmol/L 22.0 24.4   BUN mg/dL 15 18   CREATININE mg/dL 0.70 0.92   CALCIUM mg/dL 9.1 9.4   BILIRUBIN mg/dL  --  0.5   ALK PHOS U/L  --  158*   ALT (SGPT) U/L  --  22   AST (SGOT) U/L  --  23   GLUCOSE mg/dL 90 106*     Results from last 7 days   Lab Units 01/14/25  0224 01/13/25  2142 01/13/25  1757   HSTROP T ng/L 13 12 12     Results from last 7 days   Lab Units 01/14/25  0224   WBC 10*3/mm3 5.36   HEMOGLOBIN g/dL 14.0   HEMATOCRIT % 42.0   PLATELETS 10*3/mm3 183                                 I personally viewed and interpreted the patient's EKG/Telemetry data.        Assessment and Plan:       1.  Atrial fibrillation.  She is rate controlled.  2.  Chest pain.  No ischemic changes on her EKG and 3 normal troponins.  I discussed with the patient and her 2 children the possibility of doing a stress test today versus treating her for GERD " with over-the-counter medication.  The patient does not want to have any further testing performed.  She did not complete her echocardiogram yesterday.  Her son relates that she has been trying to pull off all of her wires and her IV out overnight.  The end decision after a lengthy discussion was that she is going to try over-the-counter GI medication and follow-up with me in [place] in 1 to 2 weeks.  Her children will bring her back to the emergency room if she has severe chest pain.  I highly suspect her symptoms are GI in origin.  3.  Dementia.    I am okay with discharge today.  My office will contact her to set up a follow-up appointment in [place] in 1 to 2 weeks.    Emily Kaufman MD  01/14/25  10:40 EST

## 2025-01-21 ENCOUNTER — TELEPHONE (OUTPATIENT)
Dept: CARDIOLOGY | Facility: CLINIC | Age: 89
End: 2025-01-21

## 2025-01-21 ENCOUNTER — OFFICE VISIT (OUTPATIENT)
Age: 89
End: 2025-01-21
Payer: MEDICARE

## 2025-01-21 VITALS
HEIGHT: 66 IN | BODY MASS INDEX: 23.14 KG/M2 | DIASTOLIC BLOOD PRESSURE: 70 MMHG | HEART RATE: 97 BPM | SYSTOLIC BLOOD PRESSURE: 122 MMHG | WEIGHT: 144 LBS

## 2025-01-21 DIAGNOSIS — I48.19 PERSISTENT ATRIAL FIBRILLATION: Primary | ICD-10-CM

## 2025-01-21 NOTE — TELEPHONE ENCOUNTER
Caller: GEO JIANG (WARDA)    Relationship to patient: Emergency Contact    Best call back number: [phone]    Patient is needing: PATIENT WAS SCHEDULED FOR ONE WEEK HOSPITAL VISIT 1.21.25 IN [place] . PATIENT WOKE UP TO SORE THROAT AND WAS RESCHEDULED NEXT AVAILABLE FOR 2.11.25

## 2025-01-21 NOTE — PROGRESS NOTES
"      CARDIOLOGY    Emily Kaufman MD    ENCOUNTER DATE:  01/21/2025    Sharifa Lozada / 89 y.o. / female        CHIEF COMPLAINT / REASON FOR OFFICE VISIT     Shortness of Breath and Atrial Fibrillation      HISTORY OF PRESENT ILLNESS       HPI    Sharifa Lozada is a 89 y.o. female     I saw her in the hospital on January 14, 2025.  She was complaining of a midsternal chest discomfort.  She was diagnosed with a new onset of atrial fibrillation.  EKG did not show ischemic changes.  She had 3 normal troponins.  He echocardiogram showed normal LV function.  Ejection fraction 60 to 65%, mild left ventricular hypertrophy, mild tricuspid regurgitation.  Small pericardial effusion.  The exam was cut short because the patient wished for it to be over.  The atrial fibrillation was rate controlled.  We discussed the possibility of a stress test but given her underlying dementia and the fact she was pulling off wires and did not want further testing to be done, she was discharged to follow-up with me in a short period of time.    She comes in today accompanied by her daughter.  There has been no more complaints of chest pain.  She had a couple episodes of shortness of breath this morning which on her pulse ox was associated with her heart rate dropping into the 50s but otherwise she has not complained of anything since leaving the hospital.    VITAL SIGNS     Visit Vitals  /70 (BP Location: Left arm)   Pulse 97   Ht 167.6 cm (66\")   Wt 65.3 kg (144 lb)   BMI 23.24 kg/m²         Wt Readings from Last 3 Encounters:   01/21/25 65.3 kg (144 lb)   01/14/25 65.8 kg (145 lb)   11/19/24 68 kg (150 lb)     Body mass index is 23.24 kg/m².      PHYSICAL EXAMINATION     Constitutional:       General: Not in acute distress.  Neck:      Vascular: No carotid bruit or JVD.   Pulmonary:      Effort: Pulmonary effort is normal.      Breath sounds: Normal breath sounds.   Cardiovascular:      Normal rate. Irregularly irregular rhythm.      " Murmurs: There is no murmur.   Psychiatric:         Mood and Affect: Mood and affect normal.           REVIEWED DATA       ECG 12 Lead    Date/Time: 1/21/2025 1:32 PM  Performed by: Emily Kaufman MD    Authorized by: Emily Kaufman MD  Comparison: compared with previous ECG from 1/13/2025  Similar to previous ECG  Rhythm: atrial fibrillation  BPM: 97  Conduction: conduction normal  ST Segments: ST segments normal  T Waves: T waves normal    Clinical impression: abnormal EKG                Lab Results   Component Value Date    GLUCOSE 90 01/14/2025    BUN 15 01/14/2025    CREATININE 0.70 01/14/2025     01/14/2025    K 3.8 01/14/2025     (H) 01/14/2025    CALCIUM 9.1 01/14/2025    PROTEINTOT 6.7 01/13/2025    ALBUMIN 3.9 01/13/2025    ALT 22 01/13/2025    AST 23 01/13/2025    ALKPHOS 158 (H) 01/13/2025    BILITOT 0.5 01/13/2025    GLOB 2.8 01/13/2025    AGRATIO 1.4 01/13/2025    BCR 21.4 01/14/2025    ANIONGAP 8.0 01/14/2025    EGFR 83.8 01/14/2025       ASSESSMENT & PLAN      Diagnosis Plan   1. Persistent atrial fibrillation            1.  Atrial fibrillation.  She is rate controlled off of medications.  I discussed with her daughter about anticoagulation.  She said her mother is always tried to avoid medication unless absolutely necessary.  I explained increase stroke risk but also explained that there are risks to being on anticoagulation.  The end decision was not to start anticoagulation at this time and I think that is reasonable given her age and dementia.  2.  Chest pain.  No reoccurrence.  3.  Shortness of breath associate with heart rates down in the 50s.  This morning was the first time this happened.  I told her daughter to message me through [employer] if she has any more issues and I will arrange for her to come and get a Zio patch put on in [place].  4.  Dementia.    Follow-up in 3 months to reassess the A-fib and how she is doing with everything.    Orders Placed This Encounter    Procedures    ECG 12 Lead     This order was created via procedure documentation     Order Specific Question:   Release to patient     Answer:   Routine Release [[id number]]           MEDICATIONS         Discharge Medications      as of January 21, 2025  1:32 PM     Patient Not Prescribed Medications Upon Discharge           Emily Kaufman MD  01/21/25  13:32 EST    Part of this note may be an electronic transcription/translation of spoken language to printed text using the Dragon dictation system.

## 2025-05-23 DIAGNOSIS — M67.952 TENDINOPATHY OF LEFT GLUTEAL REGION: Primary | ICD-10-CM

## 2025-05-23 DIAGNOSIS — M70.62 GREATER TROCHANTERIC BURSITIS OF LEFT HIP: ICD-10-CM

## 2025-05-23 DIAGNOSIS — M16.12 PRIMARY OSTEOARTHRITIS OF LEFT HIP: ICD-10-CM
